# Patient Record
Sex: MALE | Race: WHITE | NOT HISPANIC OR LATINO | Employment: OTHER | URBAN - NONMETROPOLITAN AREA
[De-identification: names, ages, dates, MRNs, and addresses within clinical notes are randomized per-mention and may not be internally consistent; named-entity substitution may affect disease eponyms.]

---

## 2022-10-24 ENCOUNTER — HOSPITAL ENCOUNTER (OUTPATIENT)
Facility: MEDICAL CENTER | Age: 62
End: 2022-10-24
Payer: COMMERCIAL

## 2022-10-24 LAB
C DIFF DNA SPEC QL NAA+PROBE: NEGATIVE
C DIFF TOX GENS STL QL NAA+PROBE: NEGATIVE
G LAMBLIA+C PARVUM AG STL QL RAPID: NORMAL
H PYLORI AG STL QL IA: NOT DETECTED
SIGNIFICANT IND 70042: NORMAL
SITE SITE: NORMAL
SOURCE SOURCE: NORMAL

## 2022-10-24 PROCEDURE — 87329 GIARDIA AG IA: CPT

## 2022-10-24 PROCEDURE — 82705 FATS/LIPIDS FECES QUAL: CPT

## 2022-10-24 PROCEDURE — 82653 EL-1 FECAL QUANTITATIVE: CPT

## 2022-10-24 PROCEDURE — 83993 ASSAY FOR CALPROTECTIN FECAL: CPT

## 2022-10-24 PROCEDURE — 87493 C DIFF AMPLIFIED PROBE: CPT

## 2022-10-24 PROCEDURE — 87328 CRYPTOSPORIDIUM AG IA: CPT

## 2022-10-24 PROCEDURE — 87338 HPYLORI STOOL AG IA: CPT

## 2022-10-26 LAB
FAT STL QL: NORMAL
NEUTRAL FAT STL QL: NORMAL

## 2022-10-27 LAB
CALPROTECTIN STL-MCNT: 15 UG/G
ELASTASE PANC STL-MCNT: >800 UG/G

## 2023-06-20 ENCOUNTER — APPOINTMENT (OUTPATIENT)
Dept: RADIOLOGY | Facility: MEDICAL CENTER | Age: 63
End: 2023-06-20
Attending: EMERGENCY MEDICINE
Payer: COMMERCIAL

## 2023-06-20 ENCOUNTER — HOSPITAL ENCOUNTER (EMERGENCY)
Facility: MEDICAL CENTER | Age: 63
End: 2023-06-20
Attending: EMERGENCY MEDICINE
Payer: COMMERCIAL

## 2023-06-20 ENCOUNTER — OFFICE VISIT (OUTPATIENT)
Dept: URGENT CARE | Facility: PHYSICIAN GROUP | Age: 63
End: 2023-06-20
Payer: COMMERCIAL

## 2023-06-20 VITALS
HEIGHT: 70 IN | TEMPERATURE: 97.4 F | WEIGHT: 146.16 LBS | SYSTOLIC BLOOD PRESSURE: 122 MMHG | DIASTOLIC BLOOD PRESSURE: 70 MMHG | BODY MASS INDEX: 20.93 KG/M2 | RESPIRATION RATE: 16 BRPM | OXYGEN SATURATION: 97 % | HEART RATE: 63 BPM

## 2023-06-20 VITALS
BODY MASS INDEX: 20.9 KG/M2 | DIASTOLIC BLOOD PRESSURE: 64 MMHG | TEMPERATURE: 96.7 F | SYSTOLIC BLOOD PRESSURE: 120 MMHG | HEART RATE: 63 BPM | HEIGHT: 70 IN | WEIGHT: 146 LBS | OXYGEN SATURATION: 100 % | RESPIRATION RATE: 16 BRPM

## 2023-06-20 DIAGNOSIS — Z86.69 HISTORY OF BELL'S PALSY: ICD-10-CM

## 2023-06-20 DIAGNOSIS — G51.0 FACIAL PARALYSIS ON LEFT SIDE: ICD-10-CM

## 2023-06-20 DIAGNOSIS — G51.0 BELL'S PALSY: ICD-10-CM

## 2023-06-20 LAB
ALBUMIN SERPL BCP-MCNC: 5.4 G/DL (ref 3.2–4.9)
ALBUMIN/GLOB SERPL: 2.1 G/DL
ALP SERPL-CCNC: 79 U/L (ref 30–99)
ALT SERPL-CCNC: 25 U/L (ref 2–50)
ANION GAP SERPL CALC-SCNC: 11 MMOL/L (ref 7–16)
AST SERPL-CCNC: 21 U/L (ref 12–45)
BASOPHILS # BLD AUTO: 1 % (ref 0–1.8)
BASOPHILS # BLD: 0.07 K/UL (ref 0–0.12)
BILIRUB SERPL-MCNC: 2 MG/DL (ref 0.1–1.5)
BUN SERPL-MCNC: 13 MG/DL (ref 8–22)
CALCIUM ALBUM COR SERPL-MCNC: 9.3 MG/DL (ref 8.5–10.5)
CALCIUM SERPL-MCNC: 10.4 MG/DL (ref 8.5–10.5)
CHLORIDE SERPL-SCNC: 102 MMOL/L (ref 96–112)
CO2 SERPL-SCNC: 25 MMOL/L (ref 20–33)
CREAT SERPL-MCNC: 0.97 MG/DL (ref 0.5–1.4)
EOSINOPHIL # BLD AUTO: 0.14 K/UL (ref 0–0.51)
EOSINOPHIL NFR BLD: 2 % (ref 0–6.9)
ERYTHROCYTE [DISTWIDTH] IN BLOOD BY AUTOMATED COUNT: 38.5 FL (ref 35.9–50)
GFR SERPLBLD CREATININE-BSD FMLA CKD-EPI: 88 ML/MIN/1.73 M 2
GLOBULIN SER CALC-MCNC: 2.6 G/DL (ref 1.9–3.5)
GLUCOSE SERPL-MCNC: 86 MG/DL (ref 65–99)
HCT VFR BLD AUTO: 46.7 % (ref 42–52)
HGB BLD-MCNC: 16.1 G/DL (ref 14–18)
IMM GRANULOCYTES # BLD AUTO: 0.01 K/UL (ref 0–0.11)
IMM GRANULOCYTES NFR BLD AUTO: 0.1 % (ref 0–0.9)
LYMPHOCYTES # BLD AUTO: 1.9 K/UL (ref 1–4.8)
LYMPHOCYTES NFR BLD: 27.3 % (ref 22–41)
MCH RBC QN AUTO: 28.8 PG (ref 27–33)
MCHC RBC AUTO-ENTMCNC: 34.5 G/DL (ref 32.3–36.5)
MCV RBC AUTO: 83.5 FL (ref 81.4–97.8)
MONOCYTES # BLD AUTO: 0.64 K/UL (ref 0–0.85)
MONOCYTES NFR BLD AUTO: 9.2 % (ref 0–13.4)
NEUTROPHILS # BLD AUTO: 4.2 K/UL (ref 1.82–7.42)
NEUTROPHILS NFR BLD: 60.4 % (ref 44–72)
NRBC # BLD AUTO: 0 K/UL
NRBC BLD-RTO: 0 /100 WBC (ref 0–0.2)
PLATELET # BLD AUTO: 244 K/UL (ref 164–446)
PMV BLD AUTO: 9.3 FL (ref 9–12.9)
POTASSIUM SERPL-SCNC: 3.7 MMOL/L (ref 3.6–5.5)
PROT SERPL-MCNC: 8 G/DL (ref 6–8.2)
RBC # BLD AUTO: 5.59 M/UL (ref 4.7–6.1)
SODIUM SERPL-SCNC: 138 MMOL/L (ref 135–145)
WBC # BLD AUTO: 7 K/UL (ref 4.8–10.8)

## 2023-06-20 PROCEDURE — 70487 CT MAXILLOFACIAL W/DYE: CPT

## 2023-06-20 PROCEDURE — 3074F SYST BP LT 130 MM HG: CPT | Performed by: NURSE PRACTITIONER

## 2023-06-20 PROCEDURE — 85025 COMPLETE CBC W/AUTO DIFF WBC: CPT

## 2023-06-20 PROCEDURE — 36415 COLL VENOUS BLD VENIPUNCTURE: CPT

## 2023-06-20 PROCEDURE — 99283 EMERGENCY DEPT VISIT LOW MDM: CPT

## 2023-06-20 PROCEDURE — 3078F DIAST BP <80 MM HG: CPT | Performed by: NURSE PRACTITIONER

## 2023-06-20 PROCEDURE — 700117 HCHG RX CONTRAST REV CODE 255: Performed by: EMERGENCY MEDICINE

## 2023-06-20 PROCEDURE — 80053 COMPREHEN METABOLIC PANEL: CPT

## 2023-06-20 PROCEDURE — 99205 OFFICE O/P NEW HI 60 MIN: CPT | Performed by: NURSE PRACTITIONER

## 2023-06-20 PROCEDURE — 70450 CT HEAD/BRAIN W/O DYE: CPT

## 2023-06-20 PROCEDURE — 700111 HCHG RX REV CODE 636 W/ 250 OVERRIDE (IP): Performed by: EMERGENCY MEDICINE

## 2023-06-20 RX ORDER — LACTULOSE 10 G/15ML
SOLUTION ORAL
COMMUNITY
Start: 2023-03-28

## 2023-06-20 RX ORDER — PREDNISONE 20 MG/1
40 TABLET ORAL DAILY
Qty: 8 TABLET | Refills: 0 | Status: SHIPPED | OUTPATIENT
Start: 2023-06-20 | End: 2023-06-24

## 2023-06-20 RX ORDER — OMEPRAZOLE 40 MG/1
40 CAPSULE, DELAYED RELEASE ORAL DAILY
COMMUNITY
Start: 2023-03-29

## 2023-06-20 RX ORDER — PREDNISONE 20 MG/1
60 TABLET ORAL ONCE
Status: COMPLETED | OUTPATIENT
Start: 2023-06-20 | End: 2023-06-20

## 2023-06-20 RX ADMIN — IOHEXOL 80 ML: 350 INJECTION, SOLUTION INTRAVENOUS at 17:45

## 2023-06-20 RX ADMIN — PREDNISONE 60 MG: 20 TABLET ORAL at 18:13

## 2023-06-20 NOTE — ED TRIAGE NOTES
Chief Complaint   Patient presents with    Sent from Urgent Care     Patient has hx of [place] Palsy. Patient reports for the last 3 months has been  having weakness on the right side of face. Patient denies any other weakness. Patient reports he went to urgent care and told to come to ER for further work up.

## 2023-06-20 NOTE — ED PROVIDER NOTES
ER Provider Note    Scribed for Toney Azevedo D.O. by Edyta Garcias. 6/20/2023  3:27 PM    Primary Care Provider: Pcp Pt States None    CHIEF COMPLAINT  Chief Complaint   Patient presents with    Sent from Urgent Care     Patient has hx of [place] Palsy. Patient reports for the last 3 months has been  having weakness on the right side of face. Patient denies any other weakness. Patient reports he went to urgent care and told to come to ER for further work up.      HPI/ROS    OUTSIDE HISTORIAN(S):  Significant Other (Wife)    Dhaval Allen is a 62 y.o. male who presents to the Emergency Department for left facial droop onset three months ago. The patient explains that he had a cavity filling done prior to the onset of his symptoms. The procedure was complicated by an infection and he was placed on Clindamycin. Since then, he has had weakness to the left side of his face and numbness around his mouth. He was seen at Urgent Care earlier today and then sent to he [facility] ED. He states that he had cavity fillings done prior to the onset of his weakness. He does have difficulty swallowing, but no weakness anywhere else on his body. His wife states that the patient has lost about 40 pounds. He reports that his left ear sounds more muffled than the right ear.He states that he had weakness to the left side of his face 10 years ago and lasted about 4 months.     ROS as per HPI.    PAST MEDICAL HISTORY  History reviewed. No pertinent past medical history.    SURGICAL HISTORY  History reviewed. No pertinent surgical history.    FAMILY HISTORY  History reviewed. No pertinent family history.    SOCIAL HISTORY   reports that he has been smoking pipe. He has never used smokeless tobacco. He reports that he does not currently use alcohol. He reports current drug use. Drug: Inhaled.    CURRENT MEDICATIONS  Discharge Medication List as of 6/20/2023  6:09 PM        CONTINUE these medications which have NOT CHANGED    Details  "  CONSTULOSE 10 GM/15ML Solution TAKE 15 ML BY MOUTH 1 TIME 1 HOUR PRIOR TO TESTING, BEATRICE, Historical Med      omeprazole (PRILOSEC) 40 MG delayed-release capsule Take 40 mg by mouth every day., Historical Med             ALLERGIES  Patient has no known allergies.    PHYSICAL EXAM  BP (!) 143/81   Pulse 61   Temp 36.5 °C (97.7 °F) (Temporal)   Resp 16   Ht 1.778 m (5' 10\")   Wt 66.3 kg (146 lb 2.6 oz)   SpO2 99%   BMI 20.97 kg/m²     General: No acute distress.  HENT: Normocephalic, Mucus membranes are moist. Left TM normal.  Chest: Lungs have even and unlabored respirations, Clear to auscultation.   Cardiovascular: Regular rate and regular rhythm, No peripheral cyanosis.  Abdomen: Non distended.  Neuro: Awake, Conversive, Able to relay recent events. Left facial droop, forehead is involved. No weakness in the left upper or left lower extremities and he is able to ambulate without difficulty.   Psychiatric: Calm and cooperative.     EXTERNAL RECORDS REVIEWED  Review of patient's past medical records show that the patient was seen at Urgent Care and was sent to the [facility] ED for evaluation.     INITIAL ASSESSMENT  Patient presents with prolonged period of left facial droop that is isolated to the face. Stroke is not considered. This occurred after a dental procedure. We will evaluate with a CTA to look for signs of abscess. Will also do a CT of the brain to evaluate for mass effect. Labs will be done to evaluate for electrolyte imbalance or anemia.     ED Observation Status? No; Patient does not meet criteria for ED Observation.     DIAGNOSTIC STUDIES    Labs:   Results for orders placed or performed during the hospital encounter of 06/20/23   CBC WITH DIFFERENTIAL   Result Value Ref Range    WBC 7.0 4.8 - 10.8 K/uL    RBC 5.59 4.70 - 6.10 M/uL    Hemoglobin 16.1 14.0 - 18.0 g/dL    Hematocrit 46.7 42.0 - 52.0 %    MCV 83.5 81.4 - 97.8 fL    MCH 28.8 27.0 - 33.0 pg    MCHC 34.5 32.3 - 36.5 g/dL    RDW 38.5 35.9 " - 50.0 fL    Platelet Count 244 164 - 446 K/uL    MPV 9.3 9.0 - 12.9 fL    Neutrophils-Polys 60.40 44.00 - 72.00 %    Lymphocytes 27.30 22.00 - 41.00 %    Monocytes 9.20 0.00 - 13.40 %    Eosinophils 2.00 0.00 - 6.90 %    Basophils 1.00 0.00 - 1.80 %    Immature Granulocytes 0.10 0.00 - 0.90 %    Nucleated RBC 0.00 0.00 - 0.20 /100 WBC    Neutrophils (Absolute) 4.20 1.82 - 7.42 K/uL    Lymphs (Absolute) 1.90 1.00 - 4.80 K/uL    Monos (Absolute) 0.64 0.00 - 0.85 K/uL    Eos (Absolute) 0.14 0.00 - 0.51 K/uL    Baso (Absolute) 0.07 0.00 - 0.12 K/uL    Immature Granulocytes (abs) 0.01 0.00 - 0.11 K/uL    NRBC (Absolute) 0.00 K/uL   COMP METABOLIC PANEL   Result Value Ref Range    Sodium 138 135 - 145 mmol/L    Potassium 3.7 3.6 - 5.5 mmol/L    Chloride 102 96 - 112 mmol/L    Co2 25 20 - 33 mmol/L    Anion Gap 11.0 7.0 - 16.0    Glucose 86 65 - 99 mg/dL    Bun 13 8 - 22 mg/dL    Creatinine 0.97 0.50 - 1.40 mg/dL    Calcium 10.4 8.5 - 10.5 mg/dL    AST(SGOT) 21 12 - 45 U/L    ALT(SGPT) 25 2 - 50 U/L    Alkaline Phosphatase 79 30 - 99 U/L    Total Bilirubin 2.0 (H) 0.1 - 1.5 mg/dL    Albumin 5.4 (H) 3.2 - 4.9 g/dL    Total Protein 8.0 6.0 - 8.2 g/dL    Globulin 2.6 1.9 - 3.5 g/dL    A-G Ratio 2.1 g/dL   CORRECTED CALCIUM   Result Value Ref Range    Correct Calcium 9.3 8.5 - 10.5 mg/dL   ESTIMATED GFR   Result Value Ref Range    GFR (CKD-EPI) 88 >60 mL/min/1.73 m 2      All labs reviewed by me.      Radiology:   The attending emergency physician has independently interpreted the diagnostic imaging associated with this visit and am waiting the final reading from the radiologist.   Preliminary interpretation is as follows: Chest x-ray shows no acute disease.   Radiologist interpretation:     CT-MAXILLOFACIAL WITH PLUS RECONS   Final Result      1.  No facial soft tissue inflammatory changes or abscess.   2.  No facial fracture.      CT-HEAD W/O   Final Result      Negative noncontrast CT scan of the head / brain.             COURSE & MEDICAL DECISION MAKING     COURSE AND PLAN  3:27 PM - Patient seen and examined at bedside. Discussed plan of care, including labs and imaging. Patient agrees to the plan of care. Ordered for CT-Maxillofacial with contrast, CT-Head without contrast, CMP, CBC w/ Diff, Estimated GFR, and Corrected Calcium to evaluate his symptoms.     5:49 PM - Patient was reevaluated at bedside. Discussed lab and radiology results with the patient. Patient had the opportunity to ask any questions. The plan for discharge was discussed with them and he was told to return for any new or worsening symptoms. He was also informed of the plans for follow up. Patient is understanding and agreeable to the plan for discharge.     ED Summary: Patient presents with left-sided facial droop for 3 months.  He has had a history of right facial droop/Bell's palsy that lasted for 4 months and resolve this was about 10 years ago after dental procedure.  He had a dental procedure 3 months ago and then right away he developed his symptoms of Bell's palsy.  CT of the head was done there is no signs of a stroke.  Has no lateralizing weakness otherwise I do not suspect a stroke.    There is no signs of abscess or infection causing his Bell's palsy.  He will be given a high dose of steroids to see if this may improve this.  He is referred to neurology otherwise.    Decision tools and prescription drugs considered including, but not limited to: Prednisone 20 mg tablet.      DISPOSITION AND DISCUSSIONS  I have discussed management of the patient with the following physicians and MERLY's: None    Discussion of management with other QHP or appropriate source(s): None    Barriers to care at this time, including but not limited to: None     The patient will return for new or worsening symptoms and is stable at the time of discharge.    The patient is referred to a primary physician for blood pressure management, diabetic screening, and for all other  preventative health concerns.    DISPOSITION:  Patient will be discharged home in stable condition.    FOLLOW UP:  [facility]  [address] [place] [address], [address]  [place]ae Armijo [address] [phone]        OUTPATIENT MEDICATIONS:  Discharge Medication List as of 6/20/2023  6:09 PM        START taking these medications    Details   predniSONE (DELTASONE) 20 MG Tab Take 2 Tablets by mouth every day for 4 days., Disp-8 Tablet, R-0, Normal            FINAL DIAGNOSIS  1. Bell's palsy      Edyta HALL (Cyd[place]), am scribing for, and in the presence of, Toney Azevedo D.O..    Electronically signed by: Edyta Garcias (Cyd[place]), 6/20/2023    Toney HALL D.O. personally performed the services described in this documentation, as scribed by Edyta Garcias in my presence, and it is both accurate and complete.     The note accurately reflects work and decisions made by me.  Toney Azevedo D.O.  6/20/2023  8:33 PM

## 2023-06-20 NOTE — PROGRESS NOTES
Dhaval Allen is a 62 y.o. male who presents for Facial Droop (Poss Pickett palsy. 2nd time having this happen. Both after dental procedures.  )      HPI  This is a new problem. Dhaval Allen is a 62 y.o. patient who presents to urgent care with c/o: having [place] Palsy for the past 3 months on left side of his face. Sx started after a major dental procedure that had complications of post operative infection followed by several antibiotics and then cdiff infection.   He is having a hard time closing his left eye.  He finds that his eye is irritated.  He has tried patches do not stay in place.  He has tried taping his eyelid shut which also was ineffective.  His eye is dry and irritated.  His vision is reported as normal.  He has tried over-the-counter eyedrops and lubricant ointment.  It is difficult for him to swallow and eat due to the paralysis of the left side of his face.  He had this occur 1 other time after a dental procedure several years ago on the right side.  He was given a tapering steroid dose which helped resolve most of his symptoms on the right.  This time symptoms have recurred on the left.  It also started after a dental procedure.  Denies headache, focal weakness other than his face, chest pain, tick bites, HSV infection, recent illness  His wife reports that it has been so hard for him to eat or drink that he is loosing a significant amount of weight.   No other aggravating or alleviating factors.       ROS See HPI    Allergies:     No Known Allergies    PMSFS Hx:  History reviewed. No pertinent past medical history.  History reviewed. No pertinent surgical history.  History reviewed. No pertinent family history.  Social History     Tobacco Use    Smoking status: Some Days     Types: Pipe    Smokeless tobacco: Never   Vaping Use    Vaping Use: Never used   Substance Use Topics    Alcohol use: Not Currently       Problems:   There is no problem list on file for this patient.      Medications:   Current  "Outpatient Medications on File Prior to Visit   Medication Sig Dispense Refill    CONSTULOSE 10 GM/15ML Solution TAKE 15 ML BY MOUTH 1 TIME 1 HOUR PRIOR TO TESTING      omeprazole (PRILOSEC) 40 MG delayed-release capsule Take 40 mg by mouth every day.       No current facility-administered medications on file prior to visit.          Objective:     /64   Pulse 63   Temp 35.9 °C (96.7 °F) (Temporal)   Resp 16   Ht 1.778 m (5' 10\")   Wt 66.2 kg (146 lb)   SpO2 100%   BMI 20.95 kg/m²     Physical Exam  Vitals and nursing note reviewed.   Constitutional:       Appearance: Normal appearance. He is normal weight.   HENT:      Head:      Comments: Left sided facial paralysis. Forehead paralysis. Absence of nasal labial fold on left. Unable to close eye. Does not blink on left eye.      Mouth/Throat:      Mouth: Mucous membranes are moist.   Cardiovascular:      Rate and Rhythm: Normal rate and regular rhythm.      Pulses: Normal pulses.      Heart sounds: Normal heart sounds.   Pulmonary:      Effort: Pulmonary effort is normal.      Breath sounds: Normal breath sounds.   Skin:     General: Skin is warm.      Capillary Refill: Capillary refill takes less than 2 seconds.   Neurological:      Mental Status: He is alert and oriented to person, place, and time.   Psychiatric:         Mood and Affect: Mood normal.         Behavior: Behavior normal.         Thought Content: Thought content normal.           Assessment /Associated Orders:      1. Facial paralysis on left side        2. History of Bell's palsy              Medical Decision Making:    Pt is clinically stable at today's acute urgent care visit.  No acute distress noted.   Acute problem today with uncertain prognosis.   3 month hx of left sided facial paralysis. Symtoms have not been evaluated in the past. Atypical presentation of a second event on contralateral side. These symptoms are much worse that his reported first episode.   Pt's clinical " presentation and exam today indicate a need for higher level of care with further evaluation and/or diagnostics.   [facility] was  called to arrange transfer to higher level of care in ER.  Pt is to be transported via POV. Declines ambulance transport. .   I have reiterated to patient that although an Urgent Care to ER transfer was made this will not necessarily expedite the ER process        Please note that this dictation was created using voice recognition software. I have worked with consultants from the vendor as well as technical experts from [employer] to optimize the interface. I have made every reasonable attempt to correct obvious errors, but I expect that there are errors of grammar and possibly content that I did not discover before finalizing the note.  This note was electronically signed by provider

## 2023-06-21 NOTE — ED NOTES
Written and verbal discharge instructions given to patient. Patient acknowledges and reports understanding of instructions.  Patient is agreeable to discharge at this time.  D/c to home with sig other.

## 2023-06-21 NOTE — DISCHARGE INSTRUCTIONS
Take the steroid medication as prescribed.  Hopefully this will come back with time like the other one did but there is no way to know.    Please call the number provided above to follow-up with the neurology specialist.  Return for any change or worsening symptoms.

## 2023-08-23 ENCOUNTER — TELEPHONE (OUTPATIENT)
Dept: URGENT CARE | Facility: PHYSICIAN GROUP | Age: 63
End: 2023-08-23
Payer: COMMERCIAL

## 2023-11-14 ENCOUNTER — APPOINTMENT (OUTPATIENT)
Dept: NEUROLOGY | Facility: MEDICAL CENTER | Age: 63
End: 2023-11-14
Attending: PSYCHIATRY & NEUROLOGY

## 2023-11-16 ENCOUNTER — TELEPHONE (OUTPATIENT)
Dept: HEALTH INFORMATION MANAGEMENT | Facility: OTHER | Age: 63
End: 2023-11-16